# Patient Record
Sex: FEMALE | Race: WHITE | NOT HISPANIC OR LATINO | Employment: FULL TIME | URBAN - METROPOLITAN AREA
[De-identification: names, ages, dates, MRNs, and addresses within clinical notes are randomized per-mention and may not be internally consistent; named-entity substitution may affect disease eponyms.]

---

## 2019-01-05 ENCOUNTER — HOSPITAL ENCOUNTER (INPATIENT)
Facility: HOSPITAL | Age: 34
LOS: 1 days | Discharge: HOME | End: 2019-01-07
Attending: OBSTETRICS & GYNECOLOGY | Admitting: OBSTETRICS & GYNECOLOGY

## 2019-01-06 PROBLEM — F19.10 SUBSTANCE ABUSE (HCC): Status: ACTIVE | Noted: 2019-01-06

## 2019-01-06 PROBLEM — Z72.0 TOBACCO ABUSE: Status: ACTIVE | Noted: 2019-01-06

## 2019-01-06 PROBLEM — O09.33 PRENATAL CARE INSUFFICIENT, THIRD TRIMESTER: Status: ACTIVE | Noted: 2019-01-06

## 2019-01-06 PROBLEM — Z98.891 PREVIOUS CESAREAN SECTION: Status: ACTIVE | Noted: 2019-01-06

## 2019-01-06 PROBLEM — O09.33 NO PRENATAL CARE IN CURRENT PREGNANCY, THIRD TRIMESTER: Status: ACTIVE | Noted: 2019-01-06

## 2019-01-06 LAB
ABO GROUP BLD: NORMAL
ALBUMIN SERPL-MCNC: 3.39 G/DL (ref 3.2–4.8)
ALBUMIN/GLOB SERPL: 1.3 G/DL (ref 1.5–2.5)
ALP SERPL-CCNC: 183 U/L (ref 25–100)
ALP SERPL-CCNC: 183 U/L (ref 25–100)
ALT SERPL W P-5'-P-CCNC: 15 U/L (ref 7–40)
ALT SERPL W P-5'-P-CCNC: 15 U/L (ref 7–40)
AMPHET+METHAMPHET UR QL: NEGATIVE
AMPHETAMINES UR QL: NEGATIVE
ANION GAP SERPL CALCULATED.3IONS-SCNC: 9 MMOL/L (ref 3–11)
AST SERPL-CCNC: 20 U/L (ref 0–33)
AST SERPL-CCNC: 20 U/L (ref 0–33)
BACTERIA UR QL AUTO: ABNORMAL /HPF
BARBITURATES UR QL SCN: NEGATIVE
BASOPHILS # BLD AUTO: 0.02 10*3/MM3 (ref 0–0.2)
BASOPHILS # BLD AUTO: 0.02 10*3/MM3 (ref 0–0.2)
BASOPHILS NFR BLD AUTO: 0.2 % (ref 0–1)
BASOPHILS NFR BLD AUTO: 0.2 % (ref 0–1)
BENZODIAZ UR QL SCN: NEGATIVE
BILIRUB SERPL-MCNC: 0.4 MG/DL (ref 0.3–1.2)
BILIRUB SERPL-MCNC: 0.4 MG/DL (ref 0.3–1.2)
BILIRUB UR QL STRIP: NEGATIVE
BLD GP AB SCN SERPL QL: NEGATIVE
BUN BLD-MCNC: 9 MG/DL (ref 9–23)
BUN/CREAT SERPL: 15 (ref 7–25)
BUPRENORPHINE SERPL-MCNC: NEGATIVE NG/ML
CALCIUM SPEC-SCNC: 8.5 MG/DL (ref 8.7–10.4)
CANNABINOIDS SERPL QL: NEGATIVE
CHLORIDE SERPL-SCNC: 101 MMOL/L (ref 99–109)
CLARITY UR: ABNORMAL
CO2 SERPL-SCNC: 24 MMOL/L (ref 20–31)
COCAINE UR QL: NEGATIVE
COLOR UR: YELLOW
CREAT BLD-MCNC: 0.6 MG/DL (ref 0.6–1.3)
CREAT BLD-MCNC: 0.6 MG/DL (ref 0.6–1.3)
DEPRECATED RDW RBC AUTO: 40.7 FL (ref 37–54)
DEPRECATED RDW RBC AUTO: 42.2 FL (ref 37–54)
EOSINOPHIL # BLD AUTO: 0.08 10*3/MM3 (ref 0–0.3)
EOSINOPHIL # BLD AUTO: 0.16 10*3/MM3 (ref 0–0.3)
EOSINOPHIL NFR BLD AUTO: 0.9 % (ref 0–3)
EOSINOPHIL NFR BLD AUTO: 1.7 % (ref 0–3)
ERYTHROCYTE [DISTWIDTH] IN BLOOD BY AUTOMATED COUNT: 14.2 % (ref 11.3–14.5)
ERYTHROCYTE [DISTWIDTH] IN BLOOD BY AUTOMATED COUNT: 14.3 % (ref 11.3–14.5)
GFR SERPL CREATININE-BSD FRML MDRD: 116 ML/MIN/1.73
GLOBULIN UR ELPH-MCNC: 2.6 GM/DL
GLUCOSE BLD-MCNC: 81 MG/DL (ref 70–100)
GLUCOSE UR STRIP-MCNC: NEGATIVE MG/DL
HAV IGM SERPL QL IA: ABNORMAL
HBV CORE IGM SERPL QL IA: ABNORMAL
HBV SURFACE AG SERPL QL IA: ABNORMAL
HBV SURFACE AG SERPL QL IA: NORMAL
HBV SURFACE AG SERPL QL IA: NORMAL
HCT VFR BLD AUTO: 27 % (ref 34.5–44)
HCT VFR BLD AUTO: 27.3 % (ref 34.5–44)
HCV AB SER DONR QL: REACTIVE
HCV AB SER DONR QL: REACTIVE
HGB BLD-MCNC: 8.5 G/DL (ref 11.5–15.5)
HGB BLD-MCNC: 8.7 G/DL (ref 11.5–15.5)
HGB UR QL STRIP.AUTO: NEGATIVE
HIV1+2 AB SER QL: NORMAL
HIV1+2 AB SER QL: NORMAL
HYALINE CASTS UR QL AUTO: ABNORMAL /LPF
IMM GRANULOCYTES # BLD AUTO: 0.15 10*3/MM3 (ref 0–0.03)
IMM GRANULOCYTES # BLD AUTO: 0.22 10*3/MM3 (ref 0–0.03)
IMM GRANULOCYTES NFR BLD AUTO: 1.7 % (ref 0–0.6)
IMM GRANULOCYTES NFR BLD AUTO: 2.3 % (ref 0–0.6)
KETONES UR QL STRIP: NEGATIVE
LDH SERPL-CCNC: 132 U/L (ref 120–246)
LEUKOCYTE ESTERASE UR QL STRIP.AUTO: ABNORMAL
LYMPHOCYTES # BLD AUTO: 2.04 10*3/MM3 (ref 0.6–4.8)
LYMPHOCYTES # BLD AUTO: 2.9 10*3/MM3 (ref 0.6–4.8)
LYMPHOCYTES NFR BLD AUTO: 23.2 % (ref 24–44)
LYMPHOCYTES NFR BLD AUTO: 30 % (ref 24–44)
MCH RBC QN AUTO: 25.4 PG (ref 27–31)
MCH RBC QN AUTO: 25.5 PG (ref 27–31)
MCHC RBC AUTO-ENTMCNC: 31.5 G/DL (ref 32–36)
MCHC RBC AUTO-ENTMCNC: 31.9 G/DL (ref 32–36)
MCV RBC AUTO: 80.1 FL (ref 80–99)
MCV RBC AUTO: 80.6 FL (ref 80–99)
METHADONE UR QL SCN: NEGATIVE
MONOCYTES # BLD AUTO: 1.05 10*3/MM3 (ref 0–1)
MONOCYTES # BLD AUTO: 1.13 10*3/MM3 (ref 0–1)
MONOCYTES NFR BLD AUTO: 11.7 % (ref 0–12)
MONOCYTES NFR BLD AUTO: 11.9 % (ref 0–12)
NEUTROPHILS # BLD AUTO: 5.46 10*3/MM3 (ref 1.5–8.3)
NEUTROPHILS # BLD AUTO: 5.6 10*3/MM3 (ref 1.5–8.3)
NEUTROPHILS NFR BLD AUTO: 56.4 % (ref 41–71)
NEUTROPHILS NFR BLD AUTO: 63.8 % (ref 41–71)
NITRITE UR QL STRIP: NEGATIVE
OPIATES UR QL: POSITIVE
OXYCODONE UR QL SCN: NEGATIVE
PCP UR QL SCN: NEGATIVE
PH UR STRIP.AUTO: 8.5 [PH] (ref 5–8)
PLATELET # BLD AUTO: 285 10*3/MM3 (ref 150–450)
PLATELET # BLD AUTO: 288 10*3/MM3 (ref 150–450)
PMV BLD AUTO: 7.4 FL (ref 6–12)
PMV BLD AUTO: 7.6 FL (ref 6–12)
POTASSIUM BLD-SCNC: 3.6 MMOL/L (ref 3.5–5.5)
PROPOXYPH UR QL: NEGATIVE
PROT SERPL-MCNC: 6 G/DL (ref 5.7–8.2)
PROT UR QL STRIP: ABNORMAL
RBC # BLD AUTO: 3.35 10*6/MM3 (ref 3.89–5.14)
RBC # BLD AUTO: 3.41 10*6/MM3 (ref 3.89–5.14)
RBC # UR: ABNORMAL /HPF
REF LAB TEST METHOD: ABNORMAL
RH BLD: POSITIVE
RUBV IGG SER QL: NORMAL
RUBV IGG SER QL: NORMAL
RUBV IGG SER-ACNC: 35.5 IU/ML
RUBV IGG SER-ACNC: 39.5 IU/ML
SODIUM BLD-SCNC: 134 MMOL/L (ref 132–146)
SP GR UR STRIP: 1.01 (ref 1–1.03)
SQUAMOUS #/AREA URNS HPF: ABNORMAL /HPF
T&S EXPIRATION DATE: NORMAL
TRICYCLICS UR QL SCN: NEGATIVE
URATE SERPL-MCNC: 3.7 MG/DL (ref 3.1–7.8)
UROBILINOGEN UR QL STRIP: ABNORMAL
WBC NRBC COR # BLD: 8.79 10*3/MM3 (ref 3.5–10.8)
WBC NRBC COR # BLD: 9.67 10*3/MM3 (ref 3.5–10.8)
WBC UR QL AUTO: ABNORMAL /HPF

## 2019-01-06 PROCEDURE — 59025 FETAL NON-STRESS TEST: CPT

## 2019-01-06 PROCEDURE — 87522 HEPATITIS C REVRS TRNSCRPJ: CPT | Performed by: OBSTETRICS & GYNECOLOGY

## 2019-01-06 PROCEDURE — 84450 TRANSFERASE (AST) (SGOT): CPT | Performed by: OBSTETRICS & GYNECOLOGY

## 2019-01-06 PROCEDURE — 80081 OBSTETRIC PANEL INC HIV TSTG: CPT | Performed by: OBSTETRICS & GYNECOLOGY

## 2019-01-06 PROCEDURE — 86901 BLOOD TYPING SEROLOGIC RH(D): CPT | Performed by: OBSTETRICS & GYNECOLOGY

## 2019-01-06 PROCEDURE — 87110 CHLAMYDIA CULTURE: CPT | Performed by: OBSTETRICS & GYNECOLOGY

## 2019-01-06 PROCEDURE — 84550 ASSAY OF BLOOD/URIC ACID: CPT | Performed by: OBSTETRICS & GYNECOLOGY

## 2019-01-06 PROCEDURE — 85025 COMPLETE CBC W/AUTO DIFF WBC: CPT | Performed by: OBSTETRICS & GYNECOLOGY

## 2019-01-06 PROCEDURE — 59025 FETAL NON-STRESS TEST: CPT | Performed by: OBSTETRICS & GYNECOLOGY

## 2019-01-06 PROCEDURE — 86762 RUBELLA ANTIBODY: CPT | Performed by: OBSTETRICS & GYNECOLOGY

## 2019-01-06 PROCEDURE — 80053 COMPREHEN METABOLIC PANEL: CPT | Performed by: OBSTETRICS & GYNECOLOGY

## 2019-01-06 PROCEDURE — G0432 EIA HIV-1/HIV-2 SCREEN: HCPCS | Performed by: OBSTETRICS & GYNECOLOGY

## 2019-01-06 PROCEDURE — 86593 SYPHILIS TEST NON-TREP QUANT: CPT | Performed by: OBSTETRICS & GYNECOLOGY

## 2019-01-06 PROCEDURE — 87081 CULTURE SCREEN ONLY: CPT | Performed by: OBSTETRICS & GYNECOLOGY

## 2019-01-06 PROCEDURE — 86850 RBC ANTIBODY SCREEN: CPT | Performed by: OBSTETRICS & GYNECOLOGY

## 2019-01-06 PROCEDURE — 87086 URINE CULTURE/COLONY COUNT: CPT | Performed by: OBSTETRICS & GYNECOLOGY

## 2019-01-06 PROCEDURE — 80074 ACUTE HEPATITIS PANEL: CPT | Performed by: OBSTETRICS & GYNECOLOGY

## 2019-01-06 PROCEDURE — 80306 DRUG TEST PRSMV INSTRMNT: CPT | Performed by: OBSTETRICS & GYNECOLOGY

## 2019-01-06 PROCEDURE — 80307 DRUG TEST PRSMV CHEM ANLYZR: CPT | Performed by: OBSTETRICS & GYNECOLOGY

## 2019-01-06 PROCEDURE — 81001 URINALYSIS AUTO W/SCOPE: CPT | Performed by: OBSTETRICS & GYNECOLOGY

## 2019-01-06 PROCEDURE — 87140 CULTURE TYPE IMMUNOFLUORESC: CPT | Performed by: OBSTETRICS & GYNECOLOGY

## 2019-01-06 PROCEDURE — 86803 HEPATITIS C AB TEST: CPT | Performed by: OBSTETRICS & GYNECOLOGY

## 2019-01-06 PROCEDURE — 86780 TREPONEMA PALLIDUM: CPT | Performed by: OBSTETRICS & GYNECOLOGY

## 2019-01-06 PROCEDURE — 86900 BLOOD TYPING SEROLOGIC ABO: CPT | Performed by: OBSTETRICS & GYNECOLOGY

## 2019-01-06 PROCEDURE — 99222 1ST HOSP IP/OBS MODERATE 55: CPT | Performed by: OBSTETRICS & GYNECOLOGY

## 2019-01-06 PROCEDURE — 83615 LACTATE (LD) (LDH) ENZYME: CPT | Performed by: OBSTETRICS & GYNECOLOGY

## 2019-01-06 PROCEDURE — 82565 ASSAY OF CREATININE: CPT | Performed by: OBSTETRICS & GYNECOLOGY

## 2019-01-06 PROCEDURE — 84460 ALANINE AMINO (ALT) (SGPT): CPT | Performed by: OBSTETRICS & GYNECOLOGY

## 2019-01-06 PROCEDURE — 82247 BILIRUBIN TOTAL: CPT | Performed by: OBSTETRICS & GYNECOLOGY

## 2019-01-06 PROCEDURE — 84075 ASSAY ALKALINE PHOSPHATASE: CPT | Performed by: OBSTETRICS & GYNECOLOGY

## 2019-01-06 RX ORDER — ACETAMINOPHEN 500 MG
1000 TABLET ORAL EVERY 4 HOURS PRN
Status: DISCONTINUED | OUTPATIENT
Start: 2019-01-06 | End: 2019-01-07 | Stop reason: HOSPADM

## 2019-01-06 RX ORDER — SODIUM CHLORIDE 0.9 % (FLUSH) 0.9 %
3-10 SYRINGE (ML) INJECTION AS NEEDED
Status: DISCONTINUED | OUTPATIENT
Start: 2019-01-06 | End: 2019-01-06

## 2019-01-06 RX ORDER — ZOLPIDEM TARTRATE 5 MG/1
5 TABLET ORAL NIGHTLY PRN
Status: DISCONTINUED | OUTPATIENT
Start: 2019-01-06 | End: 2019-01-07 | Stop reason: HOSPADM

## 2019-01-06 RX ORDER — NICOTINE 21 MG/24HR
1 PATCH, TRANSDERMAL 24 HOURS TRANSDERMAL DAILY
Status: DISCONTINUED | OUTPATIENT
Start: 2019-01-06 | End: 2019-01-07 | Stop reason: HOSPADM

## 2019-01-06 RX ORDER — BUPRENORPHINE HYDROCHLORIDE 8 MG/1
8 TABLET SUBLINGUAL DAILY
Status: DISCONTINUED | OUTPATIENT
Start: 2019-01-06 | End: 2019-01-07 | Stop reason: HOSPADM

## 2019-01-06 RX ORDER — SODIUM CHLORIDE 0.9 % (FLUSH) 0.9 %
3 SYRINGE (ML) INJECTION EVERY 12 HOURS SCHEDULED
Status: DISCONTINUED | OUTPATIENT
Start: 2019-01-06 | End: 2019-01-06

## 2019-01-06 RX ORDER — FERROUS SULFATE 325(65) MG
325 TABLET ORAL 2 TIMES DAILY WITH MEALS
Status: DISCONTINUED | OUTPATIENT
Start: 2019-01-06 | End: 2019-01-07 | Stop reason: HOSPADM

## 2019-01-06 RX ORDER — LIDOCAINE HYDROCHLORIDE 10 MG/ML
5 INJECTION, SOLUTION EPIDURAL; INFILTRATION; INTRACAUDAL; PERINEURAL AS NEEDED
Status: DISCONTINUED | OUTPATIENT
Start: 2019-01-06 | End: 2019-01-06

## 2019-01-06 RX ADMIN — Medication 325 MG: at 20:14

## 2019-01-06 RX ADMIN — BUPRENORPHINE 8 MG: 8 TABLET SUBLINGUAL at 13:07

## 2019-01-06 RX ADMIN — Medication 325 MG: at 08:57

## 2019-01-06 NOTE — PROGRESS NOTES
Daily Progress Note    Patient name: Mary Kaporo  YOB: 1985   MRN: [id number]  Admission Date: 2019  Date of Service: 2019  Referring Provider: Tavon Jc MD    Mary Kapoor is a 33 y.o.    at 38w1d  admitted on 2019 for Prenatal care insufficient, third trimester    Hospital day 0      Diagnoses:   Patient Active Problem List    Diagnosis   • *Prenatal care insufficient, third trimester [O09.33]   • No prenatal care in current pregnancy, third trimester [O09.33]   • Substance abuse (CMS/[facility]) [F19.10]   • Previous  section [Z98.891]   • Tobacco abuse [Z72.0]       Chief Complaint:  Not feeling well    Subjective:      Mary has no complaints today.  Reports fetal movement is normal  Denies leakage of amniotic fluid.  Denies vaginal bleeding    Objective:     Vital signs:  Temp:  [98.4 °F (36.9 °C)-98.8 °F (37.1 °C)] 98.4 °F (36.9 °C)  Heart Rate:  [104-112] 109  Resp:  [16] 16  BP: (105-133)/(57-84) 127/71    Abdomen: soft, nontender  Uterus: gravid, nontender  Extremities: nontender; no edema        Non-Stress Test:    Fetal Heart Rate Assessment   Method: Fetal HR Assessment Method: external   Beats/min: Fetal HR (beats/min): 145   Baseline: Fetal Heart Baseline Rate: normal range   Varibility: Fetal HR Variability: moderate (amplitude range 6 to 25 bpm)   Accels: Fetal HR Accelerations: greater than/equal to 15 bpm, lasting at least 15 seconds   Decels: Fetal HR Decelerations: absent   Tracing Category:       Uterine Assessment   Method: Method: external tocotransducer   Frequency (min): Contraction Frequency (Minutes): x1   Ctx Count in 10 min:     Duration:     Intensity: Contraction Intensity: no contractions   Intensity by IUPC:     Resting Tone: Uterine Resting Tone: soft by palpation   Resting Tone by IUPC:     Alligator Units:       Cervix: Exam by: Method: sterile exam per physician   Dilation:     Effacement: Cervical Effacement: 60%   Station:            Medications:    ferrous sulfate 325 mg Oral BID With Meals   nicotine 1 patch Transdermal Q24H      •  acetaminophen  •  zolpidem    Labs:  Lab Results (last 24 hours)     Procedure Component Value Units Date/Time    Group B Streptococcus Culture - Swab, Vaginal/Rectum [[id number]] Collected:  01/06/19 0156    Specimen:  Swab from Vaginal/Rectum Updated:  01/06/19 0743    Hepatitis Panel, Acute [[id number]]  (Abnormal) Collected:  01/06/19 0004    Specimen:  Blood Updated:  01/06/19 0146     Hepatitis B Surface Ag Non-Reactive     Hep A IgM Non-Reactive     Comment: Results may be falsely decreased if patient taking Biotin.        Hep B C IgM Non-Reactive     Comment: Results may be falsely decreased if patient taking Biotin.        Hepatitis C Ab Reactive    HCV RT-PCR,Quant(Non-Graph) [[id number]] Collected:  01/06/19 0004    Specimen:  Blood Updated:  01/06/19 0146    OB Panel With HIV [[id number]] Collected:  01/06/19 0004    Specimen:  Blood Updated:  01/06/19 0145    Narrative:       The following orders were created for panel order OB Panel With HIV.  Procedure                               Abnormality         Status                     ---------                               -----------         ------                     RPR[[id number]]                                              In process                 CBC Auto Differential[[id number]]        Abnormal            Final result               Hepatitis B Surface Antigen[[id number]]  Normal              Final result               Rubella Antibody, IgG[[id number]]        Normal              Final result               OB Panel Type & Screen[[id number]]                                                      HIV-1 / O / 2 Ag / Antib...[[id number]]  Normal              Final result               Hepatitis C Antibody[[id number]]                                                          Please view results for these tests on the individual orders.    Hepatitis B Surface  Antigen [[id number]]  (Normal) Collected:  01/06/19 0004    Specimen:  Blood Updated:  01/06/19 0139     Hepatitis B Surface Ag Non-Reactive    Rubella Antibody, IgG [[id number]]  (Normal) Collected:  01/06/19 0004    Specimen:  Blood Updated:  01/06/19 0139     Rubella IgG Quant 39.5 IU/mL      Rubella IgG Scr Interp Immune    Narrative:       A positive result greater than or equal to 10 Int Units/mL indicates prior infection with RUBELLA virus and probable immunity against subsequent apparent infections and viremia.    HIV-1 / O / 2 Ag / Antibody 4th Generation [[id number]]  (Normal) Collected:  01/06/19 0004    Specimen:  Blood Updated:  01/06/19 0133     HIV-1/ HIV-2 Non-Reactive    Narrative:       The HIV antigen/antibody combo assay is a qualitative assay for HIV that includes the p24 antigen as well as antibodies to HIV types 1 and 2.  The assay is intended to be used as a screening assay in the diagnosis of HIV infection in pediatric and adult populations, but has not been validated for children under age 2.  A reactive result does not distinguish HIV-1 p24 antigen, HIV-1 antibody, HIV-2 antibody, and HIV-1 group O antibody.  A positive result will be reflexed to a follow up immunoassay for antibody differentiation.    Comprehensive Metabolic Panel [[id number]]  (Abnormal) Collected:  01/06/19 0004    Specimen:  Blood Updated:  01/06/19 0104     Glucose 81 mg/dL      BUN 9 mg/dL      Creatinine 0.60 mg/dL      Sodium 134 mmol/L      Potassium 3.6 mmol/L      Chloride 101 mmol/L      CO2 24.0 mmol/L      Calcium 8.5 mg/dL      Total Protein 6.0 g/dL      Albumin 3.39 g/dL      ALT (SGPT) 15 U/L      AST (SGOT) 20 U/L      Alkaline Phosphatase 183 U/L      Total Bilirubin 0.4 mg/dL      eGFR Non African Amer 116 mL/min/1.73      Globulin 2.6 gm/dL      A/G Ratio 1.3 g/dL      BUN/Creatinine Ratio 15.0     Anion Gap 9.0 mmol/L     Narrative:       [employer] Guidelines    Stage     Description         GFR  1         Normal or High     90+  2         Mild decrease      60-89  3         Moderate decrease  30-59  4         Severe decrease    15-29  5         Kidney failure     <15    The MDRD GFR formula is only valid for adults with stable renal function between ages 18 and 70.    Preeclampsia Panel [[id number]]  (Abnormal) Collected:  01/06/19 0004    Specimen:  Blood Updated:  01/06/19 0104     Alkaline Phosphatase 183 U/L      ALT (SGPT) 15 U/L      AST (SGOT) 20 U/L      Creatinine 0.60 mg/dL      Total Bilirubin 0.4 mg/dL       U/L      Uric Acid 3.7 mg/dL     Urine Drug Screen - Urine, Clean Catch [[id number]]  (Abnormal) Collected:  01/06/19 0000    Specimen:  Urine, Clean Catch Updated:  01/06/19 0024     THC, Screen, Urine Negative     Phencyclidine (PCP), Urine Negative     Cocaine Screen, Urine Negative     Methamphetamine, Urine Negative     Opiate Screen Positive     Amphetamine Screen, Urine Negative     Benzodiazepine Screen, Urine Negative     Tricyclic Antidepressants Screen Negative     Methadone Screen, Urine Negative     Barbiturates Screen, Urine Negative     Oxycodone Screen, Urine Negative     Propoxyphene Screen Negative     Buprenorphine, Screen, Urine Negative    Narrative:       Cutoff For Drugs Screened:    Amphetamines               500 ng/ml  Barbiturates               200 ng/ml  Benzodiazepines            150 ng/ml  Cocaine                    150 ng/ml  Methadone                  200 ng/ml  Opiates                    100 ng/ml  Phencyclidine               25 ng/ml  THC                            50 ng/ml  Methamphetamine            500 ng/ml  Tricyclic Antidepressants  300 ng/ml  Oxycodone                  100 ng/ml  Propoxyphene               300 ng/ml  Buprenorphine               10 ng/ml    The normal value for all drugs tested is negative. This report includes unconfirmed screening results, with the cutoff values listed, to be used for medical treatment purposes only.   Unconfirmed results must not be used for non-medical purposes such as employment or legal testing.  Clinical consideration should be applied to any drug of abuse test, particularly when unconfirmed results are used.      Opiates Confirmation, Urine - Urine, Clean Catch [[id number]] Collected:  01/06/19 0000    Specimen:  Urine, Clean Catch Updated:  01/06/19 0023    CBC Auto Differential [[id number]]  (Abnormal) Collected:  01/06/19 0004    Specimen:  Blood Updated:  01/06/19 0020     WBC 9.67 10*3/mm3      RBC 3.41 10*6/mm3      Hemoglobin 8.7 g/dL      Hematocrit 27.3 %      MCV 80.1 fL      MCH 25.5 pg      MCHC 31.9 g/dL      RDW 14.2 %      RDW-SD 40.7 fl      MPV 7.4 fL      Platelets 285 10*3/mm3      Neutrophil % 56.4 %      Lymphocyte % 30.0 %      Monocyte % 11.7 %      Eosinophil % 1.7 %      Basophil % 0.2 %      Immature Grans % 2.3 %      Neutrophils, Absolute 5.46 10*3/mm3      Lymphocytes, Absolute 2.90 10*3/mm3      Monocytes, Absolute 1.13 10*3/mm3      Eosinophils, Absolute 0.16 10*3/mm3      Basophils, Absolute 0.02 10*3/mm3      Immature Grans, Absolute 0.22 10*3/mm3     Urinalysis With Culture If Indicated - Urine, Clean Catch [[id number]]  (Abnormal) Collected:  01/06/19 0000    Specimen:  Urine, Clean Catch Updated:  01/06/19 0016     Color, UA Yellow     Appearance, UA Cloudy     pH, UA 8.5     Specific Gravity, UA 1.015     Glucose, UA Negative     Ketones, UA Negative     Bilirubin, UA Negative     Blood, UA Negative     Protein, UA 30 mg/dL (1+)     Leuk Esterase, UA Small (1+)     Nitrite, UA Negative     Urobilinogen, UA 1.0 E.U./dL    Urinalysis, Microscopic Only - Urine, Clean Catch [[id number]]  (Abnormal) Collected:  01/06/19 0000    Specimen:  Urine, Clean Catch Updated:  01/06/19 0016     RBC, UA 0-2 /HPF      WBC, UA 6-12 /HPF      Bacteria, UA None Seen /HPF      Squamous Epithelial Cells, UA 3-6 /HPF      Hyaline Casts, UA 0-6 /LPF      Methodology Automated Microscopy    Urine  Culture - Urine, Urine, Clean Catch [[id number]] Collected:  19 0000    Specimen:  Urine, Clean Catch Updated:  19 0016    RPR [[id number]] Collected:  19 0004    Specimen:  Blood Updated:  19 0013        Lab Results   Component Value Date    HGB 8.7 (L) 2019         Assessment/Plan:      Mary is a 33 y.o.    at 33-35 week gestation.  1. Prenatal care insufficient, third trimester:  2. Previous  X 1  For breech  Desires   3. Hx of heroin abuse no current signs of withdrawal  ( patient willing to start on a opioid maintenance program)   4. Hep C positive   5. Anemia Fe def by Hx ( 8.7/27.3)  6. Tobacco abuse  PLAN  1. Start on Fe, nicotine patch PDC U/S tomorrow, Will talk to Dr Ballesteros re continued ob care.   .  All questions were answered to the best my ability.    Aurelio Fischer, DO  2019

## 2019-01-06 NOTE — H&P
"Mary Carrier  1985  [id number]  [id number]    Referring physician: Ismael Jansen M.D.    CC:not feeling well   HPI:  Patient is 33 y.o. white female   currently at 38w1d referred from [facility] after pt presented with multiple complaints.  Pt c/o feeling flush, poss elevated BP, some N.  Pt with limited PNC in [facility] (they were unable to find any record of patient there).  Pt admits to using Heroin 3 days ago (UDS + heroin in [facility]).  Pt currently feels better.  Denies HA, has some N, no V.  Good FM.  PNC comp by limited PNC, prev  (breech), tob use, substance abuse.     PMH:  Current meds: PNV  Illnesses: substance abuse  Surgeries: , repair femur fracture (), oral surg  Allergies: NKDA    Past OB History:       Obstetric History       T4      L5     SAB0   TAB0   Ectopic0   Molar0   Multiple0   Live Births5       # Outcome Date GA Lbr Savage/2nd Weight Sex Delivery Anes PTL Lv   6 Current            5   36w0d   F CS-LTranv Spinal  PRIMITIVO   4 Term  40w0d   F Vag-Spont EPI  PRIMITIVO   3 Term  37w0d   F Vag-Spont EPI  PRIMITIVO   2 Term  42w0d   M Vag-Spont EPI  PRIMITIVO   1 Term  40w0d   F Vag-Spont EPI  PRIMITIVO               SH: tob 1/2 PPD , EtOH neg, drugs pos (heroin)  FH: heart dz neg , diabetes neg , cancer pos    General ROS: N,edema.   All other systems reviewed and are negative.      Physical Examination: General appearance - alert, well appearing, and in no distress  Vital signs - /84   Pulse 112   Temp 98.5 °F (36.9 °C) (Oral)   Resp 16   Ht 157.5 cm (62\")   Wt 63 kg (139 lb)   BMI 25.42 kg/m²   HEENT: normocephalic, atraumatic,oropharynx clear, appearance of ears and nose normal  Neck - supple, no significant adenopathy, no thyromegaly  Lymphatics - no palpable lymphadenopathy in the neck or groin, no hepatosplenomegaly  Chest - clear to auscultation, no wheezes, rales or rhonchi, respiratory effort non-labored  Heart - normal rate, " regular rhythm, normal S1, S2, no murmurs, rubs, clicks or gallops, no JVD, no lower extremity edema  Abdomen - soft, nontender, nondistended, no masses, no hepatosplenomegaly  no rebound tenderness noted, bowel sounds normal  Vaginal Exam: 1/2/60%/-2, no blood in vault ,external genitalia normal  Extremities - no pedal edema noted, no calf tend  Skin -warm and dry, normal coloration and turgor, no rashes, no suspicious skin lesions noted      Labs:  Results from last 7 days   Lab Units  19   0004   WBC 10*3/mm3  9.67   HEMOGLOBIN g/dL  8.7*   HEMATOCRIT %  27.3*   PLATELETS 10*3/mm3  285     Results from last 7 days   Lab Units  19   0004   SODIUM mmol/L  134   POTASSIUM mmol/L  3.6   CHLORIDE mmol/L  101   CO2 mmol/L  24.0   BUN mg/dL  9   CREATININE mg/dL  0.60  0.60   CALCIUM mg/dL  8.5*   BILIRUBIN mg/dL  0.4  0.4   ALK PHOS U/L  183*  183*   ALT (SGPT) U/L  15  15   AST (SGOT) U/L  20  20   GLUCOSE mg/dL  81         Fetal monitoring: indication feeling poorly , onset 2330 , offset 0050 , baseline 140-150 , mod BTB variability , multiple accels (15 X 15), no decels, rare contractions, interpretation reactive NST    Radiology :US- single fetus, vertex, measurements 33-35 weeks (BPD, FL, AC), BPP 8/8    Assessment 1)IUP 38 1/7 weeks     2)limited PNC    3)substance abuse   4)tobacco use    5)prev    6)anemia    Plan 1)admit     2)all prenatal labs   3)cont EFM for now    4)PDC consult today or Monday  5)watch for si/sx opiate withdrawal    Tavon Jc MD  2019  1:06 AM

## 2019-01-07 ENCOUNTER — APPOINTMENT (OUTPATIENT)
Dept: WOMENS IMAGING | Facility: HOSPITAL | Age: 34
End: 2019-01-07

## 2019-01-07 VITALS
RESPIRATION RATE: 16 BRPM | SYSTOLIC BLOOD PRESSURE: 130 MMHG | BODY MASS INDEX: 25.58 KG/M2 | WEIGHT: 139 LBS | DIASTOLIC BLOOD PRESSURE: 84 MMHG | HEIGHT: 62 IN | HEART RATE: 96 BPM | TEMPERATURE: 97.5 F

## 2019-01-07 PROBLEM — A53.0 POSITIVE RPR TEST: Status: ACTIVE | Noted: 2019-01-07

## 2019-01-07 LAB
COLLECT DURATION TIME UR: 24 HRS
PROT 24H UR-MRATE: 367.5 MG/24HOURS (ref 42–225)
PROT UR-MCNC: 15 MG/DL (ref 1–14)
RPR SER QL: REACTIVE
SPECIMEN VOL 24H UR: 2450 ML

## 2019-01-07 PROCEDURE — 76811 OB US DETAILED SNGL FETUS: CPT | Performed by: OBSTETRICS & GYNECOLOGY

## 2019-01-07 PROCEDURE — 59025 FETAL NON-STRESS TEST: CPT

## 2019-01-07 PROCEDURE — 81050 URINALYSIS VOLUME MEASURE: CPT | Performed by: OBSTETRICS & GYNECOLOGY

## 2019-01-07 PROCEDURE — 76811 OB US DETAILED SNGL FETUS: CPT

## 2019-01-07 PROCEDURE — 99238 HOSP IP/OBS DSCHRG MGMT 30/<: CPT | Performed by: OBSTETRICS & GYNECOLOGY

## 2019-01-07 PROCEDURE — 76819 FETAL BIOPHYS PROFIL W/O NST: CPT | Performed by: OBSTETRICS & GYNECOLOGY

## 2019-01-07 PROCEDURE — 84156 ASSAY OF PROTEIN URINE: CPT | Performed by: OBSTETRICS & GYNECOLOGY

## 2019-01-07 PROCEDURE — 76819 FETAL BIOPHYS PROFIL W/O NST: CPT

## 2019-01-07 RX ORDER — NICOTINE 21 MG/24HR
1 PATCH, TRANSDERMAL 24 HOURS TRANSDERMAL DAILY
Qty: 21 EACH | Refills: 3 | Status: SHIPPED | OUTPATIENT
Start: 2019-01-08 | End: 2019-01-08

## 2019-01-07 RX ADMIN — NICOTINE 1 PATCH: 14 PATCH TRANSDERMAL at 08:29

## 2019-01-07 RX ADMIN — BUPRENORPHINE 8 MG: 8 TABLET SUBLINGUAL at 08:28

## 2019-01-07 RX ADMIN — Medication 325 MG: at 08:28

## 2019-01-07 NOTE — PROGRESS NOTES
In-Patient OB Consultation for Treatement of Opioid Dependence in Pregnancy    Mary Kapoor is a 33 y.o. year old .  No LMP recorded. Patient is pregnant..  She presented in transfer for [place]. I have been asked to see her to manage her prenatal care in the context of opioid [id number]    This patient is 33 years old.  This is her sixth pregnancy she has had 2 vaginal deliveries a  section for breech and her most recent delivery was a precipitous vaginal delivery at 36 weeks.  She has had 1 spontaneous miscarriage.  She reports uncomplicated pregnancies.  She has indicated that she has not abused opiates during pregnancies in the past.  With her most recent pregnancy she was using opiates until she found out she was pregnant and was able to stop with out formal therapeutic intervention.    The patient has a history of opioid abuse which dates back approximately 7 years although the timeline is somewhat confusing.  The patient has been using intravenous heroin 1.  This use began approximately 7 years ago and continued until approximately 3 years ago.  Again she stopped with her most recent pregnancy.  She reports a period of approximately 3 years of sobriety.  The patient admits to relapse 5 days ago and indicates its as if she had never stopped.  She has never undergone formal treatment for opioid abuse. She was experiencing mild withdrawal symptoms prior to beginning buprenorphine yesterday.  Her drug abuse treatment assessment is scanned and on her chart.  She scored a 7 out of 10 indicating a significant opioid abuse disorder.  This patient is single.  She indicates that her significant other does not and has not had issues with addiction.  She has shared custody of 2 of her children    We discussed the rationale of medical assisted treatment in general and the near universal agreement that medical assisted treatment is the better option in pregnancy to both decrease the risk of accidental  overdose and death as well as relapse in general  We discussed and went over the contract for the use of view buprenorphine in pregnancy.  She understands the requirements for treatment including the requirement for appropriate counseling.  She is aware of random urine drug screens and pill counts.  She is aware of her responsibility for keeping this medication safe    The following portions of the patient's history were reviewed and updated as appropriate:vital signs, allergies, current medications, past medical history, past social history, past surgical history and problem list.    Social History     Social History Narrative   • Not on file        A 14 point review of systems was negative except for: none    Objective     Physical Exam    Not performed today      Lab Review   CMP, UA and PN labs    Imaging   Pelvic ultrasound report    Assessment/Plan     ASSESSMENT  1. IUP 34 weeks by poor dating  2. Opioid abuse, recent relapse successful induction of buprenophin  3. Hep C+  4. Tobacco abuse    PLAN  1. Tests ordered today:none  2. Medications prescribed today:  none  3. Information reviewed: smoking in pregnancy, exercise in pregnancy, nutrition in pregnancy, weight gain in pregnancy, work and travel restrictions during pregnancy, list of OTC medications acceptable in pregnancy, call coverage groups and MAT as documented above and below   4. The patient will return for class and prescription tomorrow          I spent _40___ mins out of 45 mins face to face time counselling the patient regarding   the effects of opioid addiction in pregnancy as well as the management of pregnancy with MAT of opioid addiction. We further discussed the risk and benefits of MAT the need for close monitoring and supervision of the use of Subutex in pregnancy. We discussed the risk for abuse and diversion. The effects of this medication on the  were discussed in detail including JOHN.      Follow up: 1 day(s)         This note  was electronically signed.    Tim Ballesteros MD  January 7, 2019

## 2019-01-07 NOTE — DISCHARGE SUMMARY
"DANIEL SOARES  Patient Name: Mary Kapoor  : 1985  MRN: [id number]  Date of Service: 2019  Referring Provider: Tavon Jc   Attending provider: Dr. MARIANELA Ballesteros    ID: 33 y.o.  at 34w1d    Chief complaint: Not feeling well    Admission Diagnosis: No prenatal care in current pregnancy, third trimester [O09.33]    Discharge Diagnosis:   Patient Active Problem List   Diagnosis   • No prenatal care in current pregnancy, third trimester   • Prenatal care insufficient, third trimester   • Substance abuse (CMS/[facility])   • Previous  section   • Tobacco abuse       Date of Admission: 2019 11:19 PM    Date of Discharge: 2019    Discharge Condition: Stable    Discharge to: Home    Discharge Medications:    1.  Subutex 8 mg orally daily.   2.  Prenatal vitamins.    Discharge Diet:  regular    Discharge Activity: ad maura.    Follow up appointments:  Patient to follow-up with Dr. Felix Ballesteros for continued prenatal care and treatment of her narcotic dependency during pregnancy.    Hospital summary:      Mary was admitted for Prenatal care insufficient, third trimester.    She did not receive a course of  corticosteroids during her admission.      She did not receive magnesium sulfate during her admission.      Her obstetric ultrasounds and fetal testing were reassuring during her admission.  Her condition was determined to be appropriate for outpatient management and she was discharged home in stable condition.  Dr. Felix Ballesteros has agreed to see the patient for prenatal care through the remainder of her pregnancy.  He will also supervise treatment of her narcotic dependency during pregnancy.  She will be discharged to home on Subutex therapy.  She was instructed to follow up in 1 week with Dr. Ballesteros.  .    Isaiah Foley \"Tracie\"DIANE Perez MD  11:30 AM  "

## 2019-01-07 NOTE — NURSING NOTE
1445- called dr. Ballesteros regarding reactive RPR. md aware and will wait for confirmation testing and discuss with pt tomorrow at scheduled appointment.

## 2019-01-07 NOTE — PAYOR COMM NOTE
"Mary Kapoor (33 y.o. Female)   [employer] ID#[id number]  Medical Inpatient Admission 1/5/19    From: Chrissy Keenan  #[phone]  Fax#[phone]      Date of Birth Social Security Number Address Home Phone MRN    1985  [address] [id number] [phone] [id number]    Denominational Marital Status          Anabaptism Single       Admission Date Admission Type Admitting Provider Attending Provider Department, Room/Bed    1/5/19 Elective Tavon Jc MD High, Curtis L, MD [facility] ANTEPARTUM, N324/1    Discharge Date Discharge Disposition Discharge Destination                       Attending Provider:  Tavon Jc MD    Allergies:  No Known Allergies    Isolation:  None   Infection:  None   Code Status:  CPR    Ht:  157.5 cm (62\")   Wt:  63 kg (139 lb)    Admission Cmt:  None   Principal Problem:  Prenatal care insufficient, third trimester [O09.33]                 Active Insurance as of 1/5/2019     Primary Coverage     Payor Plan Insurance Group Employer/Plan Group    HUMANA MEDICAID HU[employer] [id number]     Payor Plan Address Payor Plan Phone Number Payor Plan Fax Number Effective Dates    PO  [phone]  1/5/2019 - None Entered    [facility] [id number]       Subscriber Name Subscriber Birth Date Member ID       MARY KAPOOR 1985 [id number]                 Emergency Contacts      (Rel.) Home Phone Work Phone Mobile Phone    JEAN-CLAUDE MIA (Other) [phone] -- --            Insurance Information                [employer] MEDICAID/[employer] Phone: [phone]    Subscriber: Mary Kapoor Subscriber#: [id number]    Group#: [id number] Precert#:           Treatment Team     Provider Relationship Specialty Contact    Tavon Jc MD Attending --  [phone]    Zenaida Hernandez RD Dietitian Nutrition  [phone]    Yessica Cobos, DAVE Registered Nurse Obstetrics            Problem List           Codes Noted - " [facility]    No prenatal care in current pregnancy, third trimester ICD-10-CM: O09.33  ICD-9-CM: V23.7 2019 - Present    * (Principal) Prenatal care insufficient, third trimester ICD-10-CM: O09.33  ICD-9-CM: V23.7 2019 - Present    Substance abuse (CMS/[facility]) ICD-10-CM: F19.10  ICD-9-CM: 305.90 2019 - Present    Previous  section ICD-10-CM: Z98.891  ICD-9-CM: V45.89 2019 - Present    Tobacco abuse ICD-10-CM: Z72.0  ICD-9-CM: 305.1 2019 - Present             History & Physical      High, Tavon SANCHEZ MD at 2019  1:06 AM          Mary Carrier  1985  [id number]  [id number]    Referring physician: Ismael Jansen M.D.    CC:not feeling well   HPI:  Patient is 33 y.o. white female   currently at 38w1d referred from [place] after pt presented with multiple complaints.  Pt c/o feeling flush, poss elevated BP, some N.  [facility] were unable to find any record of patient there).  Pt admits to using Heroin 3 days ago (UDS + heroin in [place]).  Pt currently feels better.  Denies HA, has some N, no V.  Good FM.  PNC comp by limited PNC, prev  (breech), tob use, substance abuse.     PMH:  Current meds: PNV  Illnesses: substance abuse  Surgeries: , repair femur fracture (), oral surg  Allergies: NKDA    Past OB History:       Obstetric History       T4      L5     SAB0   TAB0   Ectopic0   Molar0   Multiple0   Live Births5       # Outcome Date GA Lbr Savage/2nd Weight Sex Delivery Anes PTL Lv   6 Current            5   36w0d   F CS-LTranv Spinal  PRIMITIVO   4 Term  40w0d   F Vag-Spont EPI  PRIMITIVO   3 Term  37w0d   F Vag-Spont EPI  PRIMITIVO   2 Term  42w0d   M Vag-Spont EPI  PRIMITIVO   1 Term  40w0d   F Vag-Spont EPI  PRIMITIVO               SH: tob 1/2 PPD , EtOH neg, drugs pos (heroin)  FH: heart dz neg , diabetes neg , cancer pos    General ROS: N,edema.   All other systems reviewed and are  "negative.      Physical Examination: General appearance - alert, well appearing, and in no distress  Vital signs - /84   Pulse 112   Temp 98.5 °F (36.9 °C) (Oral)   Resp 16   Ht 157.5 cm (62\")   Wt 63 kg (139 lb)   BMI 25.42 kg/m²    HEENT: normocephalic, atraumatic,oropharynx clear, appearance of ears and nose normal  Neck - supple, no significant adenopathy, no thyromegaly  Lymphatics - no palpable lymphadenopathy in the neck or groin, no hepatosplenomegaly  Chest - clear to auscultation, no wheezes, rales or rhonchi, respiratory effort non-labored  Heart - normal rate, regular rhythm, normal S1, S2, no murmurs, rubs, clicks or gallops, no JVD, no lower extremity edema  Abdomen - soft, nontender, nondistended, no masses, no hepatosplenomegaly  no rebound tenderness noted, bowel sounds normal  Vaginal Exam: 1//60%/-2, no blood in vault ,external genitalia normal  Extremities - no pedal edema noted, no calf tend  Skin -warm and dry, normal coloration and turgor, no rashes, no suspicious skin lesions noted      Labs:  Results from last 7 days   Lab Units  19   0004   WBC 10*3/mm3  9.67   HEMOGLOBIN g/dL  8.7*   HEMATOCRIT %  27.3*   PLATELETS 10*3/mm3  285     Results from last 7 days   Lab Units  19   0004   SODIUM mmol/L  134   POTASSIUM mmol/L  3.6   CHLORIDE mmol/L  101   CO2 mmol/L  24.0   BUN mg/dL  9   CREATININE mg/dL  0.60  0.60   CALCIUM mg/dL  8.5*   BILIRUBIN mg/dL  0.4  0.4   ALK PHOS U/L  183*  183*   ALT (SGPT) U/L  15  15   AST (SGOT) U/L  20  20   GLUCOSE mg/dL  81         Fetal monitoring: indication feeling poorly , onset 2330 , offset 0050 , baseline 140-150 , mod BTB variability , multiple accels (15 X 15), no decels, rare contractions, interpretation reactive NST    Radiology :US- single fetus, vertex, measurements 33-35 weeks (BPD, FL, AC), BPP 8/8    Assessment 1)IUP 38 1/7 weeks     2)limited PNC    3)substance abuse   4)tobacco use    5)prev    " "6)anemia    Plan 1)admit     2)all prenatal labs   3)cont EFM for now    4)PDC consult today or Monday  5)watch for si/sx opiate withdrawal    Tavon Jc MD  1/6/2019  1:06 AM                                                                      Electronically signed by Tavon Jc MD at 1/6/2019  1:20 AM       ICU Vital Signs     Row Name 01/07/19 0814 01/07/19 0736 01/07/19 0349 01/06/19 2304 01/06/19 1948       Vitals    Temp  --  --  98 °F (36.7 °C)  97.9 °F (36.6 °C)  97.6 °F (36.4 °C)    Temp src  --  --  Oral  Oral  Oral    Pulse  --  --  92  111  111    Heart Rate Source  --  --  Monitor  Monitor  Monitor    Resp  --  --  18  18  15    Resp Rate Source  --  --  Visual  Visual  Visual    BP  --  --  122/69  129/90  133/78    BP Location  --  --  --  Left arm  --    BP Method  --  --  --  Automatic  --    Patient Position  --  --  --  Lying  --       Patient Observation    Observations  -- pt back from pdc  -- pt off unit to pdc for u/s  --  --  --    Row Name 01/06/19 1052 01/06/19 0715 01/06/19 0314 01/06/19 0045 01/05/19 2341       Height and Weight    Height  --  --  --  --  157.5 cm (62\")    Weight  --  --  --  --  63 kg (139 lb)    Ideal Body Weight (IBW) (kg)  --  --  --  --  50.43    BSA (Calculated - sq m)  --  --  --  --  1.64 sq meters    BMI (Calculated)  --  --  --  --  25.4    Weight in (lb) to have BMI = 25  --  --  --  --  136.4       Vitals    Temp  98 °F (36.7 °C)  98.4 °F (36.9 °C)  98.8 °F (37.1 °C)  --  --    Temp src  Oral  Oral  Oral  --  --    Pulse  95  109  108  112  --    Heart Rate Source  Monitor  Monitor  Monitor  --  --    Resp  15  16  16  --  --    Resp Rate Source  Visual  Visual  Visual  --  --    BP  130/81  127/71  105/57  133/84  --    Row Name 01/05/19 2333                   Vitals    Temp  98.5 °F (36.9 °C)        Temp src  Oral        Pulse  104        Heart Rate Source  Monitor        Resp  16        Resp Rate Source  Visual        BP  130/65        "     Hospital Medications (active)       Dose Frequency Start End    acetaminophen (TYLENOL) tablet 1,000 mg 1,000 mg Every 4 Hours PRN 2019     Sig - Route: Take 2 tablets by mouth Every 4 (Four) Hours As Needed for Mild Pain  or Headache. - Oral    buprenorphine (SUBUTEX) SL tablet 8 mg 8 mg Daily 2019    Sig - Route: Place 1 tablet under the tongue Daily. - Sublingual    ferrous sulfate tablet 325 mg 325 mg 2 Times Daily With Meals 2019     Sig - Route: Take 1 tablet by mouth 2 (Two) Times a Day With Meals. - Oral    nicotine (NICODERM CQ) 14 MG/24HR patch 1 patch 1 patch Daily 2019     Sig - Route: Place 1 patch on the skin as directed by provider Daily. - Transdermal    zolpidem (AMBIEN) tablet 5 mg 5 mg Nightly PRN 2019    Sig - Route: Take 1 tablet by mouth At Night As Needed for Sleep. - Oral          Orders (last 72 hrs)     Start     Ordered    19 0800  [facility]  Diagnostic Center  1 Time Imaging      19 0123    19 0800  Inpatient Maternal & Fetal Medicine Consult  Once,   Status:  Canceled     Specialty:  Maternal and Fetal Medicine  Provider:  (Not yet assigned)    19 0123    19 1315  buprenorphine (SUBUTEX) SL tablet 8 mg  Daily      19 1226    19 1241  HCV RT-PCR,Quant(Non-Graph)  Once      19 1240    19 1005  Chlamydia Trachomatis Culture - Swab, Cervix  Once      19 1005    19 1000  nicotine (NICODERM CQ) 14 MG/24HR patch 1 patch  Daily      19 0859    19 0830  ferrous sulfate tablet 325 mg  2 Times Daily With Meals      19 0740    19 0800  Fetal Nonstress Test  3 Times Daily     Comments:  No chief complaint on file.      19 0655    19 0215  sodium chloride 0.9 % flush 3 mL  Every 12 Hours Scheduled,   Status:  Discontinued      19 0123    19 0153  Group B Streptococcus Culture - Swab, Vaginal/Rectum  Once      19 0153    19 0147   HCV RT-PCR,Quant(Non-Graph)  Once      01/06/19 0146    01/06/19 0122  Fetal nonstress test  Once     Comments:  No chief complaint on file.      01/06/19 0123    01/06/19 0122  Diet Regular  Diet Effective Now      01/06/19 0123    01/06/19 0122  Chlamydia trachomatis, PCR - Swab, Vagina  Once,   Status:  Canceled      01/06/19 0123    01/06/19 0122  Urine Drug Screen - Urine, Clean Catch  Once,   Status:  Canceled      01/06/19 0123    01/06/19 0122  RPR  Once,   Status:  Canceled      01/06/19 0123    01/06/19 0122  Rubella Antibody, IgG  Once      01/06/19 0123    01/06/19 0122  Hepatitis B Surface Antigen  Once      01/06/19 0123    01/06/19 0122  Hepatitis C Antibody  Once      01/06/19 0123    01/06/19 0122  CBC & Differential  Once      01/06/19 0123    01/06/19 0122  Type & Screen  Once,   Status:  Canceled      01/06/19 0123    01/06/19 0122  HIV-1 & HIV-2 Antibodies  Once      01/06/19 0123    01/06/19 0122  CBC Auto Differential  PROCEDURE ONCE      01/06/19 0123    01/06/19 0122  HIV-1 / O / 2 Ag / Antibody 4th Generation  PROCEDURE ONCE      01/06/19 0123    01/06/19 0121  Admit To Obstetrics Inpatient  Once      01/06/19 0123    01/06/19 0121  Code Status and Medical Interventions:  Continuous      01/06/19 0123    01/06/19 0121  Vital Signs Per hospital policy  Per Hospital Policy      01/06/19 0123    01/06/19 0121  Up Ad Amira  Until Discontinued      01/06/19 0123    01/06/19 0121  Monitor fetal heart tones unless patient requests intermittent  Until Discontinued      01/06/19 0123    01/06/19 0121  External uterine contraction monitoring  Per Hospital Policy      01/06/19 0123    01/06/19 0121  Notify Physician (specified)  Until Discontinued      01/06/19 0123    01/06/19 0121  Notify physician for hyperstimulus (per hospital algorithm)  Until Discontinued      01/06/19 0123    01/06/19 0121  Notify physician if membranes ruptured, bleeding greater than 1 pad an hour, fetal heart tone abnormality,  and severe pain  Until Discontinued      01/06/19 0123    01/06/19 0121  CBC (No Diff)  Once,   Status:  Canceled      01/06/19 0123    01/06/19 0121  Type & Screen  Once,   Status:  Canceled      01/06/19 0123    01/06/19 0121  Insert Peripheral IV  Once,   Status:  Canceled      01/06/19 0123    01/06/19 0121  Saline Lock & Maintain IV Access  Continuous,   Status:  Canceled      01/06/19 0123    01/06/19 0121  VTE Prophylaxis Not Indicated: No Risk Factors (0); </= 3 (Low Risk)  Once      01/06/19 0123    01/06/19 0120  acetaminophen (TYLENOL) tablet 1,000 mg  Every 4 Hours PRN      01/06/19 0123    01/06/19 0120  zolpidem (AMBIEN) tablet 5 mg  Nightly PRN      01/06/19 0123    01/06/19 0120  lidocaine PF 1% (XYLOCAINE) injection 5 mL  As Needed,   Status:  Discontinued      01/06/19 0123    01/06/19 0120  sodium chloride 0.9 % flush 3-10 mL  As Needed,   Status:  Discontinued      01/06/19 0123    01/06/19 0039  Protein, Urine, 24 Hour - Urine, Clean Catch  Once      01/06/19 0038    01/06/19 0030  Case Management  Consult  Once     Provider:  (Not yet assigned)    01/06/19 0030    01/06/19 0024  Opiates Confirmation, Urine - Urine, Clean Catch  Once      01/06/19 0023    01/06/19 0017  Urine Culture - Urine,  Once      01/06/19 0016    01/06/19 0015  Urinalysis, Microscopic Only - Urine, Clean Catch  Once      01/06/19 0014    01/05/19 2342  Type & Screen  STAT      01/05/19 2343    01/05/19 2340  Urine Drug Screen - Urine, Clean Catch  STAT      01/05/19 2340    01/05/19 2339  Urinalysis With Culture If Indicated - Urine, Clean Catch  STAT      01/05/19 2340    01/05/19 2337  Hepatitis Panel, Acute  STAT      01/05/19 2340    01/05/19 2336  Preeclampsia Panel  STAT      01/05/19 2340    01/05/19 2336  CBC (No Diff)  STAT,   Status:  Canceled      01/05/19 2340    01/05/19 2335  OB Panel With HIV  STAT      01/05/19 2340    01/05/19 2335  Comprehensive Metabolic Panel  STAT      01/05/19 2340     19 2335  RPR  PROCEDURE ONCE      19 2340    19 2335  CBC Auto Differential  PROCEDURE ONCE      19 2340    19 2335  Hepatitis B Surface Antigen  PROCEDURE ONCE      19 2340    19 2335  Rubella Antibody, IgG  PROCEDURE ONCE      19 2340    19 2335  OB Panel Type & Screen  PROCEDURE ONCE,   Status:  Canceled      19 2340    19 2335  HIV-1 / O / 2 Ag / Antibody 4th Generation  PROCEDURE ONCE      19 2340    19 2335  Hepatitis C Antibody  PROCEDURE ONCE,   Status:  Canceled      19 2340    Unscheduled  Position Change - For Intra-Uterine Resusitation for Hypertonus, HyperStimulation or Non-Reassuring Fetal Status  As Needed      19 0123          Operative/Procedure Notes (last 72 hours) (Notes from 2019  8:19 AM through 2019  8:19 AM)     No notes of this type exist for this encounter.           Physician Progress Notes (last 72 hours) (Notes from 2019  8:19 AM through 2019  8:19 AM)      Aurelio Fischer DO at 2019 12:27 PM        Laborist    I spoke with Dr Ballesteros regarding opioid abuse.  We will start subutex   today  8mg daily, increase if needed.  F/U with Dr Ballesteros outpatient if patient undelivered at discharge.    Electronically signed by Aurelio Fischer DO at 2019 12:30 PM     Aurelio Fischer DO at 2019  8:59 AM          Daily Progress Note    Patient name: Mary Kapoor  YOB: 1985   MRN: [id number]  Admission Date: 2019  Date of Service: 2019  Referring Provider: Tavon Jc MD    Mary Kapoor is a 33 y.o.    at 38w1d  admitted on 2019 for Prenatal care insufficient, third trimester    Hospital day 0      Diagnoses:   Patient Active Problem List    Diagnosis   • *Prenatal care insufficient, third trimester [O09.33]   • No prenatal care in current pregnancy, third trimester [O09.33]   • Substance abuse (CMS/HCC) [F19.10]   • Previous   section [Z98.891]   • Tobacco abuse [Z72.0]       Chief Complaint:  Not feeling well    Subjective:      Mary has no complaints today.  Reports fetal movement is normal  Denies leakage of amniotic fluid.  Denies vaginal bleeding    Objective:     Vital signs:  Temp:  [98.4 °F (36.9 °C)-98.8 °F (37.1 °C)] 98.4 °F (36.9 °C)  Heart Rate:  [104-112] 109  Resp:  [16] 16  BP: (105-133)/(57-84) 127/71    Abdomen: soft, nontender  Uterus: gravid, nontender  Extremities: nontender; no edema        Non-Stress Test:    Fetal Heart Rate Assessment   Method: Fetal HR Assessment Method: external   Beats/min: Fetal HR (beats/min): 145   Baseline: Fetal Heart Baseline Rate: normal range   Varibility: Fetal HR Variability: moderate (amplitude range 6 to 25 bpm)   Accels: Fetal HR Accelerations: greater than/equal to 15 bpm, lasting at least 15 seconds   Decels: Fetal HR Decelerations: absent   Tracing Category:       Uterine Assessment   Method: Method: external tocotransducer   Frequency (min): Contraction Frequency (Minutes): x1   Ctx Count in 10 min:     Duration:     Intensity: Contraction Intensity: no contractions   Intensity by IUPC:     Resting Tone: Uterine Resting Tone: soft by palpation   Resting Tone by IUPC:     [place] Units:       Cervix: Exam by: Method: sterile exam per physician   Dilation:     Effacement: Cervical Effacement: 60%   Station:           Medications:    ferrous sulfate 325 mg Oral BID With Meals   nicotine 1 patch Transdermal Q24H      •  acetaminophen  •  zolpidem    Labs:  Lab Results (last 24 hours)     Procedure Component Value Units Date/Time    Group B Streptococcus Culture - Swab, Vaginal/Rectum [[id number]] Collected:  01/06/19 0156    Specimen:  Swab from Vaginal/Rectum Updated:  01/06/19 0743    Hepatitis Panel, Acute [[id number]]  (Abnormal) Collected:  01/06/19 0004    Specimen:  Blood Updated:  01/06/19 0146     Hepatitis B Surface Ag Non-Reactive     Hep A IgM Non-Reactive      Comment: Results may be falsely decreased if patient taking Biotin.        Hep B C IgM Non-Reactive     Comment: Results may be falsely decreased if patient taking Biotin.        Hepatitis C Ab Reactive    HCV RT-PCR,Quant(Non-Graph) [[id number]] Collected:  01/06/19 0004    Specimen:  Blood Updated:  01/06/19 0146    OB Panel With HIV [[id number]] Collected:  01/06/19 0004    Specimen:  Blood Updated:  01/06/19 0145    Narrative:       The following orders were created for panel order OB Panel With HIV.  Procedure                               Abnormality         Status                     ---------                               -----------         ------                     RPR[[id number]]                                              In process                 CBC Auto Differential[[id number]]        Abnormal            Final result               Hepatitis B Surface Antigen[[id number]]  Normal              Final result               Rubella Antibody, IgG[[id number]]        Normal              Final result               OB Panel Type & Screen[[id number]]                                                      HIV-1 / O / 2 Ag / Antib...[[id number]]  Normal              Final result               Hepatitis C Antibody[[id number]]                                                          Please view results for these tests on the individual orders.    Hepatitis B Surface Antigen [[id number]]  (Normal) Collected:  01/06/19 0004    Specimen:  Blood Updated:  01/06/19 0139     Hepatitis B Surface Ag Non-Reactive    Rubella Antibody, IgG [[id number]]  (Normal) Collected:  01/06/19 0004    Specimen:  Blood Updated:  01/06/19 0139     Rubella IgG Quant 39.5 IU/mL      Rubella IgG Scr Interp Immune    Narrative:       A positive result greater than or equal to 10 Int Units/mL indicates prior infection with RUBELLA virus and probable immunity against subsequent apparent infections and viremia.    HIV-1 / O / 2 Ag / Antibody 4th Generation  [[id number]]  (Normal) Collected:  01/06/19 0004    Specimen:  Blood Updated:  01/06/19 0133     HIV-1/ HIV-2 Non-Reactive    Narrative:       The HIV antigen/antibody combo assay is a qualitative assay for HIV that includes the p24 antigen as well as antibodies to HIV types 1 and 2.  The assay is intended to be used as a screening assay in the diagnosis of HIV infection in pediatric and adult populations, but has not been validated for children under age 2.  A reactive result does not distinguish HIV-1 p24 antigen, HIV-1 antibody, HIV-2 antibody, and HIV-1 group O antibody.  A positive result will be reflexed to a follow up immunoassay for antibody differentiation.    Comprehensive Metabolic Panel [[id number]]  (Abnormal) Collected:  01/06/19 0004    Specimen:  Blood Updated:  01/06/19 0104     Glucose 81 mg/dL      BUN 9 mg/dL      Creatinine 0.60 mg/dL      Sodium 134 mmol/L      Potassium 3.6 mmol/L      Chloride 101 mmol/L      CO2 24.0 mmol/L      Calcium 8.5 mg/dL      Total Protein 6.0 g/dL      Albumin 3.39 g/dL      ALT (SGPT) 15 U/L      AST (SGOT) 20 U/L      Alkaline Phosphatase 183 U/L      Total Bilirubin 0.4 mg/dL      eGFR Non African Amer 116 mL/min/1.73      Globulin 2.6 gm/dL      A/G Ratio 1.3 g/dL      BUN/Creatinine Ratio 15.0     Anion Gap 9.0 mmol/L     Narrative:       National [employer] Guidelines    Stage     Description        GFR  1         Normal or High     90+  2         Mild decrease      60-89  3         Moderate decrease  30-59  4         Severe decrease    15-29  5         Kidney failure     <15    The MDRD GFR formula is only valid for adults with stable renal function between ages 18 and 70.    Preeclampsia Panel [[id number]]  (Abnormal) Collected:  01/06/19 0004    Specimen:  Blood Updated:  01/06/19 0104     Alkaline Phosphatase 183 U/L      ALT (SGPT) 15 U/L      AST (SGOT) 20 U/L      Creatinine 0.60 mg/dL      Total Bilirubin 0.4 mg/dL       U/L      Uric Acid  3.7 mg/dL     Urine Drug Screen - Urine, Clean Catch [[id number]]  (Abnormal) Collected:  01/06/19 0000    Specimen:  Urine, Clean Catch Updated:  01/06/19 0024     THC, Screen, Urine Negative     Phencyclidine (PCP), Urine Negative     Cocaine Screen, Urine Negative     Methamphetamine, Urine Negative     Opiate Screen Positive     Amphetamine Screen, Urine Negative     Benzodiazepine Screen, Urine Negative     Tricyclic Antidepressants Screen Negative     Methadone Screen, Urine Negative     Barbiturates Screen, Urine Negative     Oxycodone Screen, Urine Negative     Propoxyphene Screen Negative     Buprenorphine, Screen, Urine Negative    Narrative:       Cutoff For Drugs Screened:    Amphetamines               500 ng/ml  Barbiturates               200 ng/ml  Benzodiazepines            150 ng/ml  Cocaine                    150 ng/ml  Methadone                  200 ng/ml  Opiates                    100 ng/ml  Phencyclidine               25 ng/ml  THC                            50 ng/ml  Methamphetamine            500 ng/ml  Tricyclic Antidepressants  300 ng/ml  Oxycodone                  100 ng/ml  Propoxyphene               300 ng/ml  Buprenorphine               10 ng/ml    The normal value for all drugs tested is negative. This report includes unconfirmed screening results, with the cutoff values listed, to be used for medical treatment purposes only.  Unconfirmed results must not be used for non-medical purposes such as employment or legal testing.  Clinical consideration should be applied to any drug of abuse test, particularly when unconfirmed results are used.      Opiates Confirmation, Urine - Urine, Clean Catch [[id number]] Collected:  01/06/19 0000    Specimen:  Urine, Clean Catch Updated:  01/06/19 0023    CBC Auto Differential [[id number]]  (Abnormal) Collected:  01/06/19 0004    Specimen:  Blood Updated:  01/06/19 0020     WBC 9.67 10*3/mm3      RBC 3.41 10*6/mm3      Hemoglobin 8.7 g/dL      Hematocrit  27.3 %      MCV 80.1 fL      MCH 25.5 pg      MCHC 31.9 g/dL      RDW 14.2 %      RDW-SD 40.7 fl      MPV 7.4 fL      Platelets 285 10*3/mm3      Neutrophil % 56.4 %      Lymphocyte % 30.0 %      Monocyte % 11.7 %      Eosinophil % 1.7 %      Basophil % 0.2 %      Immature Grans % 2.3 %      Neutrophils, Absolute 5.46 10*3/mm3      Lymphocytes, Absolute 2.90 10*3/mm3      Monocytes, Absolute 1.13 10*3/mm3      Eosinophils, Absolute 0.16 10*3/mm3      Basophils, Absolute 0.02 10*3/mm3      Immature Grans, Absolute 0.22 10*3/mm3     Urinalysis With Culture If Indicated - Urine, Clean Catch [[id number]]  (Abnormal) Collected:  19 0000    Specimen:  Urine, Clean Catch Updated:  19     Color, UA Yellow     Appearance, UA Cloudy     pH, UA 8.5     Specific Gravity, UA 1.015     Glucose, UA Negative     Ketones, UA Negative     Bilirubin, UA Negative     Blood, UA Negative     Protein, UA 30 mg/dL (1+)     Leuk Esterase, UA Small (1+)     Nitrite, UA Negative     Urobilinogen, UA 1.0 E.U./dL    Urinalysis, Microscopic Only - Urine, Clean Catch [[id number]]  (Abnormal) Collected:  19 0000    Specimen:  Urine, Clean Catch Updated:  19     RBC, UA 0-2 /HPF      WBC, UA 6-12 /HPF      Bacteria, UA None Seen /HPF      Squamous Epithelial Cells, UA 3-6 /HPF      Hyaline Casts, UA 0-6 /LPF      Methodology Automated Microscopy    Urine Culture - Urine, Urine, Clean Catch [[id number]] Collected:  19 0000    Specimen:  Urine, Clean Catch Updated:  19    RPR [[id number]] Collected:  19 0004    Specimen:  Blood Updated:  19        Lab Results   Component Value Date    HGB 8.7 (L) 2019         Assessment/Plan:      Mary is a 33 y.o.    at 33-35 week gestation.  1. Prenatal care insufficient, third trimester:  2. Previous  X 1  For breech  Desires   3. Hx of heroin abuse no current signs of withdrawal  ( patient willing to start on a opioid  maintenance program)   4. Hep C positive   5. Anemia Fe def by Hx ( 8.7/27.3)  6. Tobacco abuse  PLAN  1. Start on Fe, nicotine patch PDC U/S tomorrow, Will talk to Dr Ballesteros re continued ob care.   .  All questions were answered to the best my ability.    Aurelio Fischer DO  1/6/2019      Electronically signed by Aurelio Fischer DO at 1/6/2019  9:12 AM       Consult Notes (last 72 hours) (Notes from 1/4/2019  8:19 AM through 1/7/2019  8:19 AM)     No notes of this type exist for this encounter.           Nursing Assessments (last 72 hours)      Adult PCS Body System    No documentation.

## 2019-01-07 NOTE — POST-PROCEDURE NOTE
MFM Ultrasound    [facility]  Size = 34 1/7 weeks, c/w EDC of 17 February, 2019  LY normal  S/D normal  BPP 8/8    Recommend using today's dates for EDC unless other records available.  Rec 2X/week testing and delviery at 39 weeks gestation or with labor.

## 2019-01-07 NOTE — DISCHARGE INSTR - LAB
Meet for Subutex class at 11am on January 8th, 2019.  Appointment with Dr. Ballesteros after class.

## 2019-01-07 NOTE — CONSULTS
Continued Stay Note  [facility]     Patient Name: Mary Kapoor  MRN: [id number]  Today's Date: 1/7/2019    Admit Date: 1/5/2019    Discharge Plan     Row Name 01/07/19 1341       Plan    Plan  Will follow for delivery    Plan Comments  Spoke with pt. She reports she has adoption plan and hRona Zafar will be adoptive mother. States Rhona Zafar is hiring attorneys and they are working with Leola Coombs with [employer] [place] at [phone].  Pt admits to daily heroin abuse. States she has not has PNC because she was afraid to seek care due to her drug abuse. She states she plans to do care with Dr Ballesteros and hopes to participate in [facility] PEP program. I discussed PEP program and encouraged her to come tomorrow at 11am. She states she had an adoption plan with Rhona Zafar with a previous delivery and all her other children live with grandparents. States her boyfriend/ FOB of some of her other children is involved and supportive. States FOB of this pregnancy is not involved. I provided listing of rehab services.         Discharge Codes    No documentation.             ARCENIO Gibson

## 2019-01-08 ENCOUNTER — INITIAL PRENATAL (OUTPATIENT)
Dept: OBSTETRICS AND GYNECOLOGY | Facility: CLINIC | Age: 34
End: 2019-01-08

## 2019-01-08 VITALS — WEIGHT: 136 LBS | BODY MASS INDEX: 24.87 KG/M2 | SYSTOLIC BLOOD PRESSURE: 120 MMHG | DIASTOLIC BLOOD PRESSURE: 66 MMHG

## 2019-01-08 DIAGNOSIS — F11.20 OPIOID DEPENDENCE ON AGONIST THERAPY (HCC): Primary | ICD-10-CM

## 2019-01-08 PROBLEM — O36.5990 POOR FETAL GROWTH AFFECTING MANAGEMENT OF MOTHER, ANTEPARTUM: Status: ACTIVE | Noted: 2019-01-08

## 2019-01-08 PROBLEM — Z34.90 PREGNANCY WITH ADOPTION PLANNED, ANTEPARTUM: Status: ACTIVE | Noted: 2019-01-08

## 2019-01-08 LAB
BACTERIA SPEC AEROBE CULT: NORMAL
BACTERIA SPEC AEROBE CULT: NORMAL
HCV RNA SERPL NAA+PROBE-ACNC: NORMAL IU/ML
REF LAB TEST METHOD: NORMAL
RPR SER-TITR: ABNORMAL {TITER}
T PALLIDUM IGG SER QL: POSITIVE
TEST INFORMATION: NORMAL

## 2019-01-08 PROCEDURE — 99214 OFFICE O/P EST MOD 30 MIN: CPT | Performed by: OBSTETRICS & GYNECOLOGY

## 2019-01-08 RX ORDER — BUPRENORPHINE HYDROCHLORIDE 8 MG/1
8 TABLET SUBLINGUAL DAILY
Qty: 10 TABLET | Refills: 0 | Status: SHIPPED | OUTPATIENT
Start: 2019-01-08 | End: 2019-01-18

## 2019-01-08 RX ORDER — BUPRENORPHINE HYDROCHLORIDE 8 MG/1
8 TABLET SUBLINGUAL DAILY
COMMUNITY
End: 2019-01-08 | Stop reason: SDUPTHER

## 2019-01-08 RX ORDER — NICOTINE 21 MG/24HR
1 PATCH, TRANSDERMAL 24 HOURS TRANSDERMAL DAILY
Qty: 21 EACH | Refills: 3 | Status: SHIPPED | OUTPATIENT
Start: 2019-01-08

## 2019-01-08 NOTE — PROGRESS NOTES
Chief Complaint   Patient presents with   • Initial Prenatal Visit     NOB transfer care last seen in [facility] and  seen in [facility] L&D patient states no problems       HPI: Mary is a  currently at 34w2d who today reports the following:Headache - No ; Visual change - No ; Swelling in legs - No ; Nausea - No ; Constipation - No; Diarrhea - No ; Contractions - No ; Leaking fluid - No ; Vaginal bleeding -  No.    Social History     Social History Narrative   • Not on file        ROS: Pertinent items are noted in HPI.  Vitals: See prenatal flowsheet     EXAM:  Abdomen: See prenatal flowsheet   Urine glucose/protein: See prenatal flowsheet   Pelvic: See prenatal flowsheet     Prenatal Labs  Lab Results   Component Value Date    HGB 8.5 (L) 2019    HEPBSAG Non-Reactive 2019    ABO A 2019    RH Positive 2019    ABSCRN Negative 2019    [id number] Non-Reactive 2019    HEPCVIRUSABY Reactive (C) 2019    URINECX 30,000-40,000 CFU/mL Normal Urogenital Angela 2019       MDM:  High Risk Pregnancy  Impression:  Multiparous patient with medical complications, Opioid Dependence on MAT, Compliant with medical treatment, Previous C/S with anticipated , IUGR and +RPR hEP c  Tests done today: none and UDS  Specific topics discussed at today's visit: MAT as documented below  IUGR antepartum testing  Next visit:NST     I spent 20 mins out of 25 mins face to face time counselling the patient regarding   the effects of opioid addiction in pregnancy as well as the management of pregnancy with MAT of opioid addiction. We further discussed the risk and benefits of MAT the need for close monitoring and supervision of the use of Subutex in pregnancy. We discussed the risk for abuse and diversion. The effects of this medication on the  were discussed in detail including JOHN.       Follow up: 1 week(s)   nst         This note was electronically signed.    Tim Ballesteros,  MD  January 8, 2019

## 2019-01-09 LAB
HCV RNA SERPL NAA+PROBE-ACNC: NORMAL IU/ML
TEST INFORMATION: NORMAL

## 2019-01-11 ENCOUNTER — DOCUMENTATION (OUTPATIENT)
Dept: OBSTETRICS AND GYNECOLOGY | Facility: CLINIC | Age: 34
End: 2019-01-11

## 2019-01-11 ENCOUNTER — TELEPHONE (OUTPATIENT)
Dept: OBSTETRICS AND GYNECOLOGY | Facility: CLINIC | Age: 34
End: 2019-01-11

## 2019-01-11 ENCOUNTER — TELEPHONE (OUTPATIENT)
Dept: WOMENS IMAGING | Facility: HOSPITAL | Age: 34
End: 2019-01-11

## 2019-01-11 LAB — C TRACH DNA SPEC QL NAA+PROBE: NEGATIVE

## 2019-01-11 NOTE — TELEPHONE ENCOUNTER
Patient returned call to office.  Patient states she delivered at [facility] and given results of her syphilis test that was collected at [facility].  She has been released from hospital and was not treated.  Patient states she will go to [facility] today or Monday for treatment.   She also stated she will let us know when she is treated.

## 2019-01-11 NOTE — TELEPHONE ENCOUNTER
[facility] syphilis testing for this patient done during recent hospital admission. Reviewed chart and advised her that Dr. Ballesteros is aware and has documented a plan for treatment. She v/u and will contact his office for any further information that is needed.

## 2019-01-11 NOTE — PROGRESS NOTES
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 ( this is typically 1.2M Units per buttock)  Of course her sexual partners will need treatment and testing.

## 2019-01-18 ENCOUNTER — RESULTS ENCOUNTER (OUTPATIENT)
Dept: OBSTETRICS AND GYNECOLOGY | Facility: CLINIC | Age: 34
End: 2019-01-18

## 2019-01-18 DIAGNOSIS — F11.20 OPIOID TYPE DEPENDENCE, CONTINUOUS (HCC): Primary | ICD-10-CM

## 2019-01-18 DIAGNOSIS — F11.20 OPIOID TYPE DEPENDENCE, CONTINUOUS (HCC): ICD-10-CM

## 2019-01-22 ENCOUNTER — DOCUMENTATION (OUTPATIENT)
Dept: OBSTETRICS AND GYNECOLOGY | Facility: CLINIC | Age: 34
End: 2019-01-22

## 2019-01-22 ENCOUNTER — POSTPARTUM VISIT (OUTPATIENT)
Dept: OBSTETRICS AND GYNECOLOGY | Facility: CLINIC | Age: 34
End: 2019-01-22

## 2019-01-22 VITALS
SYSTOLIC BLOOD PRESSURE: 100 MMHG | DIASTOLIC BLOOD PRESSURE: 66 MMHG | WEIGHT: 131 LBS | HEIGHT: 62 IN | BODY MASS INDEX: 24.11 KG/M2

## 2019-01-22 DIAGNOSIS — Z30.09 FAMILY PLANNING COUNSELING: ICD-10-CM

## 2019-01-22 DIAGNOSIS — F11.20 OPIOID DEPENDENCE ON AGONIST THERAPY (HCC): Primary | ICD-10-CM

## 2019-01-22 DIAGNOSIS — A51.5 SYPHILIS, EARLY LATENT: ICD-10-CM

## 2019-01-22 PROCEDURE — 99214 OFFICE O/P EST MOD 30 MIN: CPT | Performed by: OBSTETRICS & GYNECOLOGY

## 2019-01-22 RX ORDER — BUPRENORPHINE HYDROCHLORIDE AND NALOXONE HYDROCHLORIDE DIHYDRATE 8; 2 MG/1; MG/1
1 TABLET SUBLINGUAL DAILY
Qty: 14 TABLET | Refills: 0 | Status: SHIPPED | OUTPATIENT
Start: 2019-01-22 | End: 2019-02-05 | Stop reason: SDUPTHER

## 2019-01-22 NOTE — PROGRESS NOTES
Chief complaint:  Medication check.  Family planning consultation  History of present illness:  This 33-year-old patient is about 2 weeks out from a precipitous vaginal delivery.  Her prenatal course was complicated by her having had a previous  section, opioid dependence, with recent initiation of medical assisted treatment, tobacco abuse, intrauterine growth restriction, and recently diagnosed early latent syphilis.  Approximately 1 week prior to her delivery she was begun on  buprenorphine.  This was initiated while in the hospital.  She reports doing well on her current dose with no complaints of withdrawal or cravings.  I am unaware of evidence to suggest abuse or diversion.  She plans to continue this medication.  I indicated to her the schedule of surveillance and discussed with her the increased risk for relapse in the postpartum period The patient has thus far had an unremarkable postpartum course.  She's been afebrile, she has had normal lochia, she does report some swelling of her lower extremities.  Shortly before her delivery the patient was diagnosed with early latent syphilis.  This will require 3 weekly injections of penicillin.  She has had her first 2 injections.  Her  was hospitalized for 10 days.  The  did not experience  abstinence syndrome but underwent a 10 day course of intravenous antibiotics.  The patient is interested in birth control.  She does not intend to become pregnant again.  We discussed the advantages and disadvantages risk and benefits of permanent surgical sterilization for women and man.  We further discussed the role of reversible contraceptives in this context.  I described to her the advantages of long-acting reversible contraceptives specifically the Mirena IUD.  We discussed that the Mirena IUD has the lower failure rate of any reversible contraceptive and tubal sterilization for that matter.  We discussed the effects of the Mirena IUD on the  menstrual flow and I indicated to her that the device was in fact FDA approved for the treatment of menorrhagia.  I discussed with her the advantages of avoiding surgery and the use of opiates in the post operative.  Her questions were answered and she was provided information not only through the face-to-face consultation but also in written form for both the Mirena IUD and laparoscopic tubal sterilization procedures  Review of systems:  14 point otherwise negative  Vital signs:  Reviewed  Physical exam:  Not performed  Impression:  2 weeks postpartum, doing well  Opioid dependence on medical assisted treatment, doing well on current dose  Family planning consultation  Early latent syphilis being treated  Plan:  Return in 2 weeks for follow-up on view buprenorphine treatment  We will also revisit contraceptive plans  We will confirm the completed course of treatment for early latent syphilis  I personally spent 15 mins of a total 25 minutes face to face with the patient in counseling and discussion and/or coordination of care for opioid dependence on medical assisted treatment in the postpartum period, family planning consultation and the surveillance of the treatment of early latent syphilis  She reports doing well on her current dose with no complaints of withdrawal or cravings.  I am unaware of evidence to suggest abuse or diversion.  She plans to continue this medication.  I indicated to her the schedule of surveillance and discussed with her the increased risk for relapse in the postpartum period The patient has thus far had an unremarkable postpartum course.  She's been afebrile, she has had normal lochia, she does report some swelling of her lower extremities.  Shortly before her delivery the patient was diagnosed with early latent syphilis.  This will require 3 weekly injections of penicillin.  She has had her first 2 injections.  Her  was hospitalized for 10 days.  The  did not experience   abstinence syndrome but underwent a 10 day course of intravenous antibiotics.  The patient is interested in birth control.  She does not intend to become pregnant again.  We discussed the advantages and disadvantages risk and benefits of permanent surgical sterilization for women and man.  We further discussed the role of reversible contraceptives in this context.  I described to her the advantages of long-acting reversible contraceptives specifically the Mirena IUD.  We discussed that the Mirena IUD has the lower failure rate of any reversible contraceptive and tubal sterilization for that matter.  We discussed the effects of the Mirena IUD on the menstrual flow and I indicated to her that the device was in fact FDA approved for the treatment of menorrhagia.  I discussed with her the advantages of avoiding surgery and the use of opiates in the post operative.  Her questions were answered and she was provided information not only through the face-to-face consultation but also in written form for both the Mirena IUD and laparoscopic tubal sterilization

## 2019-02-05 ENCOUNTER — RESULTS ENCOUNTER (OUTPATIENT)
Dept: OBSTETRICS AND GYNECOLOGY | Facility: CLINIC | Age: 34
End: 2019-02-05

## 2019-02-05 ENCOUNTER — POSTPARTUM VISIT (OUTPATIENT)
Dept: OBSTETRICS AND GYNECOLOGY | Facility: CLINIC | Age: 34
End: 2019-02-05

## 2019-02-05 VITALS
SYSTOLIC BLOOD PRESSURE: 100 MMHG | DIASTOLIC BLOOD PRESSURE: 62 MMHG | WEIGHT: 126 LBS | HEIGHT: 62 IN | BODY MASS INDEX: 23.19 KG/M2

## 2019-02-05 DIAGNOSIS — F11.20 OPIOID DEPENDENCE ON AGONIST THERAPY (HCC): ICD-10-CM

## 2019-02-05 DIAGNOSIS — F11.20 OPIOID DEPENDENCE ON AGONIST THERAPY (HCC): Primary | ICD-10-CM

## 2019-02-05 DIAGNOSIS — Z30.09 FAMILY PLANNING COUNSELING: ICD-10-CM

## 2019-02-05 PROCEDURE — 99214 OFFICE O/P EST MOD 30 MIN: CPT | Performed by: OBSTETRICS & GYNECOLOGY

## 2019-02-05 RX ORDER — BUPRENORPHINE HYDROCHLORIDE AND NALOXONE HYDROCHLORIDE DIHYDRATE 8; 2 MG/1; MG/1
1 TABLET SUBLINGUAL DAILY
Qty: 14 TABLET | Refills: 0 | Status: SHIPPED | OUTPATIENT
Start: 2019-02-05 | End: 2019-02-19 | Stop reason: SDUPTHER

## 2019-02-05 NOTE — PROGRESS NOTES
Chief Complaint   Patient presents with   • Postpartum Care     4 week postpartum medication refill patient wants Mirena IUD at next visit       HPI: Mary is a  currently 4 weeks pp on MAT for opioid dependence today reports the following:Headache - No ; Visual change - No ; Swelling in legs - No ; Nausea - No ; Constipation - No; Diarrhea - No ;  ; Vaginal bleeding -  normal lochia.    Social History     Social History Narrative    Mary Kapoor attended Nutrition/PT/SPL class(es) on 2019.    ARCENIO Gibson          ROS: Pertinent items are noted in HPI.  Vitals: See prenatal flowsheet     EXAM:  Abdomen: See prenatal flowsheet   Urine glucose/protein: See prenatal flowsheet   Pelvic: See prenatal flowsheet     Prenatal Labs  Lab Results   Component Value Date    HGB 8.5 (L) 2019    HEPBSAG Non-Reactive 2019    ABO A 2019    RH Positive 2019    ABSCRN Negative 2019    [id number] Non-Reactive 2019    HEPCVIRUSABY Reactive (C) 2019    URINECX 30,000-40,000 CFU/mL Normal Urogenital Angela 2019       MDM:  High Risk Pregnancy  Impression:  Opioid Dependence on MAT and postpartrum state.  Tests done today: none and UDS  Specific topics discussed at today's visit: MAT as documented below  family planning options  Next visit:UDS and pp exam, pap and IUD insertion    I spent 15 mins out of 25 mins face to face time counselling the patient regarding   the effects of opioid addiction as well as the management of pregnancy and the pp state  with MAT of opioid addiction. We further discussed the risk and benefits of MAT the need for close monitoring and supervision of the use of Suboxone and the high risk for relapse in the pp perioid. We discussed the risk for abuse and diversion.  UDS reviewed. No evidence of abuse diversion or relapse       Follow up: 2 week(s)         This note was electronically signed.    Tim Ballesteros MD  2019

## 2019-02-18 ENCOUNTER — RESULTS ENCOUNTER (OUTPATIENT)
Dept: OBSTETRICS AND GYNECOLOGY | Facility: CLINIC | Age: 34
End: 2019-02-18

## 2019-02-18 DIAGNOSIS — F11.20 OPIOID DEPENDENCE ON AGONIST THERAPY (HCC): ICD-10-CM

## 2019-02-19 ENCOUNTER — POSTPARTUM VISIT (OUTPATIENT)
Dept: OBSTETRICS AND GYNECOLOGY | Facility: CLINIC | Age: 34
End: 2019-02-19

## 2019-02-19 VITALS
WEIGHT: 120 LBS | BODY MASS INDEX: 22.08 KG/M2 | DIASTOLIC BLOOD PRESSURE: 66 MMHG | SYSTOLIC BLOOD PRESSURE: 100 MMHG | HEIGHT: 62 IN

## 2019-02-19 DIAGNOSIS — F11.20 OPIOID DEPENDENCE ON AGONIST THERAPY (HCC): Primary | ICD-10-CM

## 2019-02-19 DIAGNOSIS — Z01.419 WOMEN'S ANNUAL ROUTINE GYNECOLOGICAL EXAMINATION: ICD-10-CM

## 2019-02-19 DIAGNOSIS — Z97.5 CONTRACEPTION, DEVICE INTRAUTERINE: ICD-10-CM

## 2019-02-19 PROCEDURE — 58300 INSERT INTRAUTERINE DEVICE: CPT | Performed by: OBSTETRICS & GYNECOLOGY

## 2019-02-19 RX ORDER — BUPRENORPHINE HYDROCHLORIDE AND NALOXONE HYDROCHLORIDE DIHYDRATE 8; 2 MG/1; MG/1
1 TABLET SUBLINGUAL DAILY
Qty: 14 TABLET | Refills: 0 | Status: SHIPPED | OUTPATIENT
Start: 2019-02-19 | End: 2019-03-06

## 2019-02-19 NOTE — PROGRESS NOTES
Subjective     Chief Complaint   Patient presents with   • Postpartum Care     6 weeks postpartum medication refill and IUD insertion.  Patient has not began to look for new Suboxone Clinic       Mary Kapoor is a 33 y.o. year old  presenting to be seen for her annual exam in the context of a six-week postpartum exam, contraceptive consultation and follow up of MAT of long-term, recurrent opioid dependence now in short-term remission on MAT. She indicates that she was not aware of the need to identify a long-term provider yet this has been something we have discussed since I began following her. He pregnancy was certainly difficult and could have been distracted. Nevertheless she appears compliant with treatment with no sign of diversion abuse withdrawal.She is on a stable dose. (I indicated that I would follow her at two week intervals until she establishes a provider.   She desires contraception and all FDA approved methods of contraception were reviewed. The risk benefitws limitations practical use and failure rates. She is more interested in LARC and specifically  The Mirena IUD this was discussed in detail. She has not had a perioid and has not had intercourse since delivery    Her LMP was today..  She had not had a period since delivery But, er cycles are regular, predictable and consistent every 28 - 32 days.  Usually her flow is typically normal with no more than 0 days of heavy flow each menses.   Additionally she describes no other symptoms associated with her menses.    She is not sexually active.  In the past 12 months there have not been new sexual partners.  Condoms are not typically used.  She would like to be screened for STD's at today's exam.     Current contraceptive methods being used: abstinence.  In the coming year, she is considering changing her current contraceptive method.    She exercises regularly: no.  She wears her seat belt: no.  She has concerns about domestic violence: not  "asked.    GYN screening history:  · Last pap: she does not know results of her last PAP.    No Additional Complaints Reported    The following portions of the patient's history were reviewed and updated as appropriate:vital signs, allergies, current medications, past medical history, past social history, past surgical history and problem list.    Review of Systems  Pertinent items are noted in HPI.     Physical Exam    Objective     /66   Ht 157.5 cm (62\")   Wt 54.4 kg (120 lb)   Breastfeeding? No   BMI 21.95 kg/m²       General:  well developed; well nourished  no acute distress   Constitutional: healthy   Skin:  No suspicious lesions seen   Thyroid: normal to inspection and palpation   Lungs:  breathing is unlabored  clear to auscultation bilaterally   Heart:  regular rate and rhythm, S1, S2 normal, no murmur, click, rub or gallop   Breasts:  Not performed.   Abdomen: soft, non-tender; no masses  no umbilical or inginual hernias are present  no hepato-splenomegaly   Pelvis: Clinical staff was present for exam  External genitalia:  normal appearance of the external genitalia including Bartholin's and [place]'s glands.  :  urethral meatus normal; urethral hypermobility is absent.  Vaginal:  normal pink mucosa without prolapse or lesions. blood present -  moderate amount and dark red;  Cervix:  normal appearance  Uterus:  normal size, shape and consistency retroverted;  Adnexa:  normal bimanual exam of the adnexa.   Musculoskeletal: negative   Neuro: normal without focal findings, mental status, speech normal, alert and oriented x3 and JUSTIN   Psych: oriented to time, place and person, mood and affect are within normal limits, pt is a good historian; no memory problems were noted       Lab Review   No data reviewed    Imaging  No data reviewed    Assessment/Plan     ASSESSMENT  1. Opioid dependence on agonist therapy (CMS/HCC) doing well on current dose. Needs long-term provider  I spent 15 mins out of 25 " mins face to face time counselling the patient regarding   the effects of opioid addictionwell as the managementwith MAT of opioid addiction. We further discussed the risk and benefits of MAT the need for close monitoring and supervision of the use of Suboxone. We discussed the risk for abuse and diversion.       2. Postpartum care and examination    3. Contraception, device intrauterine Family Planning consultation done. Methods reviewed in detail  Will insert Mirena today   4. Women's annual routine gynecological examination        PLAN  Orders Placed This Encounter   Procedures   • Pain Management Profile (13 Drugs) Urine - Urine, Clean Catch     Standing Status:   Future     Standing Expiration Date:   2/18/2020     New Medications Ordered This Visit   Medications   • levonorgestrel (MIRENA) 20 MCG/24HR IUD 1 each         Follow up: 2 week(s)   Medication check         This note was electronically signed.    Tim Ballesteros MD  February 19, 2019

## 2019-02-19 NOTE — PROGRESS NOTES
IUD Insertion    No LMP recorded.    Date of procedure:  2/19/2019    Risks and benefits discussed? yes  All questions answered? yes  Consents given by The patient  Written consent obtained? yes    Local anesthesia used:  no    Procedure documentation:    After verifying the patient had a low probability of being pregnant and met the criteria for insertion, a sterile speculum has placed. Vaginal discharge was scant, but moderate blood..  The posterior lip of the cervix was grasped with a tenaculum and the uterine cavity was gently sounded. There was no difficulty passing the sound through the cervix.  Cervical dilation did not need to be performed prior to placing the IUD.  The uterus was retroverted and sounded to 8 cms.  The Mirena was then prepared per the manufacturers instructions.    The Mirena was advanced to a point 2 cms from the fundus and then the arms were released from the sheath.  The device was advanced to the fundus and the device was released fully from the sheath.. The string was cut 3 cms in length.  Bleeding from the cervix was scant.    She tolerated the procedure without any difficulty.     Post procedure instructions: It was reviewed with Mary that the Mirena will not alter the timing of when she bleeds but it may decrease the quantity of flow.  Roughly 30% of people will be amenorrheic over time.  Efficacy rate of 99.2% over 5 years was discussed.  Spontaneous expulsion rate of 1-2% was also discussed.  If she has any issue with fever or excessive bleeding or pain she is to call the office immediately.      This note was electronically signed.    Tim Ballesteros M.D.  February 19, 2019

## 2019-02-26 ENCOUNTER — TELEPHONE (OUTPATIENT)
Dept: OBSTETRICS AND GYNECOLOGY | Facility: CLINIC | Age: 34
End: 2019-02-26

## 2019-02-26 NOTE — TELEPHONE ENCOUNTER
----- Message from Tim Ballesteros MD sent at 2/26/2019 12:59 PM EST -----      ----- Message -----  From: Rosa Carvalho  Sent: 2/26/2019  12:44 PM  To: Tim Ballesteros MD

## 2019-02-26 NOTE — PROGRESS NOTES
Please advise the patient that her UDS was + for multiple illicit drugs and neg for Suboxone  She will need to come in for a pill count

## 2019-03-04 ENCOUNTER — RESULTS ENCOUNTER (OUTPATIENT)
Dept: OBSTETRICS AND GYNECOLOGY | Facility: CLINIC | Age: 34
End: 2019-03-04

## 2019-03-04 DIAGNOSIS — F11.20 OPIOID DEPENDENCE ON AGONIST THERAPY (HCC): ICD-10-CM

## 2019-03-05 ENCOUNTER — TELEPHONE (OUTPATIENT)
Dept: OBSTETRICS AND GYNECOLOGY | Facility: CLINIC | Age: 34
End: 2019-03-05

## 2019-03-12 ENCOUNTER — TELEPHONE (OUTPATIENT)
Dept: OBSTETRICS AND GYNECOLOGY | Facility: CLINIC | Age: 34
End: 2019-03-12

## 2019-03-15 ENCOUNTER — RESULTS ENCOUNTER (OUTPATIENT)
Dept: OBSTETRICS AND GYNECOLOGY | Facility: CLINIC | Age: 34
End: 2019-03-15

## 2019-03-15 DIAGNOSIS — F11.20 OPIOID DEPENDENCE ON AGONIST THERAPY (HCC): ICD-10-CM
